# Patient Record
Sex: FEMALE | URBAN - METROPOLITAN AREA
[De-identification: names, ages, dates, MRNs, and addresses within clinical notes are randomized per-mention and may not be internally consistent; named-entity substitution may affect disease eponyms.]

---

## 2023-02-01 ENCOUNTER — LAB OUTSIDE AN ENCOUNTER (OUTPATIENT)
Dept: URBAN - METROPOLITAN AREA CLINIC 128 | Facility: CLINIC | Age: 88
End: 2023-02-01

## 2023-02-01 ENCOUNTER — DASHBOARD ENCOUNTERS (OUTPATIENT)
Age: 88
End: 2023-02-01

## 2023-02-01 ENCOUNTER — OFFICE VISIT (OUTPATIENT)
Dept: URBAN - METROPOLITAN AREA CLINIC 128 | Facility: CLINIC | Age: 88
End: 2023-02-01
Payer: MEDICARE

## 2023-02-01 VITALS
SYSTOLIC BLOOD PRESSURE: 126 MMHG | HEIGHT: 64 IN | BODY MASS INDEX: 17.24 KG/M2 | TEMPERATURE: 97.9 F | DIASTOLIC BLOOD PRESSURE: 60 MMHG | WEIGHT: 101 LBS | HEART RATE: 64 BPM

## 2023-02-01 DIAGNOSIS — R63.4 ABNORMAL LOSS OF WEIGHT: ICD-10-CM

## 2023-02-01 DIAGNOSIS — R74.8 ELEVATED ALKALINE PHOSPHATASE LEVEL: ICD-10-CM

## 2023-02-01 DIAGNOSIS — D64.9 ANEMIA: ICD-10-CM

## 2023-02-01 DIAGNOSIS — R63.0 LOSS OF APPETITE: ICD-10-CM

## 2023-02-01 PROBLEM — 79890006: Status: ACTIVE | Noted: 2023-02-01

## 2023-02-01 PROCEDURE — 99204 OFFICE O/P NEW MOD 45 MIN: CPT | Performed by: PHYSICIAN ASSISTANT

## 2023-02-01 RX ORDER — DILTIAZEM HYDROCHLORIDE 180 MG/1
1 CAPSULE CAPSULE, EXTENDED RELEASE ORAL ONCE A DAY
Status: ACTIVE | COMMUNITY

## 2023-02-01 RX ORDER — FLUTICASONE FUROATE AND VILANTEROL 100; 25 UG/1; UG/1
1 PUFF POWDER RESPIRATORY (INHALATION) ONCE A DAY
Status: ACTIVE | COMMUNITY

## 2023-02-01 RX ORDER — METOPROLOL TARTRATE 25 MG/1
1 TABLET WITH FOOD TABLET, FILM COATED ORAL TWICE A DAY
Status: ACTIVE | COMMUNITY

## 2023-02-01 RX ORDER — FERROUS SULFATE 325(65) MG
1 TABLET TABLET ORAL THREE TIMES A DAY
Qty: 90 TABLET | Refills: 1 | OUTPATIENT
Start: 2023-02-01

## 2023-02-01 RX ORDER — HYDRALAZINE HYDROCHLORIDE 25 MG/1
1 TABLET WITH FOOD TABLET, FILM COATED ORAL THREE TIMES A DAY
Status: ACTIVE | COMMUNITY

## 2023-02-01 NOTE — HPI-OTHER HISTORIES
The patient is a new patient who is here for evaluation for anemia. Her latest hgb/hct was 8.6/31.4. Her last colonoscopyw as 20 year ago. Last EGD: never. She has had a recent stroke in 12/2022 that occured while she was in the hospital. She has atrial fibrillation and is on eliquis currently. Her cardiologist is Dr. Mesa and she is under the care of a neurologist as well for her history of memory loss and stroke. She was seen at [facility] and admitted from 12/11-16/2023 for a UTI and CVA. 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 She was given tPA for her stroke but they have not followed up with the neurologist yet outpatiently.  It was noted she had a large staghorm calculus with a 4.2 cm stone in the UPJ unction on the right with right isded hydronephrosis.  She has since seen the urologist. There has been a loss of weight from 128 lbs to 101 lbs over 5 years.  Most of her history came from her daughter Abiodun. She denies melena or rectal bleeding.She live sin [facility] and enjoys the food selection there. Her vitamin B12 was <146 and folate was 2.7. Her TSH was 2.050.

## 2023-02-03 ENCOUNTER — TELEPHONE ENCOUNTER (OUTPATIENT)
Dept: URBAN - METROPOLITAN AREA CLINIC 92 | Facility: CLINIC | Age: 88
End: 2023-02-03

## 2023-02-03 PROBLEM — 87522002: Status: ACTIVE | Noted: 2023-02-03

## 2023-02-03 RX ORDER — FERROUS SULFATE 325(65) MG
1 TABLET TABLET ORAL THREE TIMES A DAY
Qty: 90 TABLET | Refills: 1 | Status: ACTIVE | COMMUNITY
Start: 2023-02-01

## 2023-02-03 RX ORDER — FLUTICASONE FUROATE AND VILANTEROL 100; 25 UG/1; UG/1
1 PUFF POWDER RESPIRATORY (INHALATION) ONCE A DAY
Status: ACTIVE | COMMUNITY

## 2023-02-03 RX ORDER — POLYETHYLENE GLYCOL 3350, SODIUM CHLORIDE, SODIUM BICARBONATE AND POTASSIUM CHLORIDE 420; 5.72; 11.2; 1.48 G/4L; G/4L; G/4L; G/4L
AS DIRECTED POWDER, FOR SOLUTION ORAL
Qty: 1 BOTTLE | Refills: 0 | OUTPATIENT
Start: 2023-02-03 | End: 2023-02-04

## 2023-02-03 RX ORDER — HYDRALAZINE HYDROCHLORIDE 25 MG/1
1 TABLET WITH FOOD TABLET, FILM COATED ORAL THREE TIMES A DAY
Status: ACTIVE | COMMUNITY

## 2023-02-03 RX ORDER — METOPROLOL TARTRATE 25 MG/1
1 TABLET WITH FOOD TABLET, FILM COATED ORAL TWICE A DAY
Status: ACTIVE | COMMUNITY

## 2023-02-03 RX ORDER — DILTIAZEM HYDROCHLORIDE 180 MG/1
1 CAPSULE CAPSULE, EXTENDED RELEASE ORAL ONCE A DAY
Status: ACTIVE | COMMUNITY

## 2023-02-05 LAB
A/G RATIO: 1.5
ABSOLUTE BASOPHILS: 64
ABSOLUTE EOSINOPHILS: 200
ABSOLUTE LYMPHOCYTES: 1229
ABSOLUTE MONOCYTES: 828
ABSOLUTE NEUTROPHILS: 6780
ALBUMIN: 4.3
ALKALINE PHOSPHATASE: 171
ALKALINE PHOSPHATASE: 185
ALT (SGPT): 14
AST (SGOT): 20
BASOPHILS: 0.7
BILIRUBIN, TOTAL: 0.4
BONE ISOENZYMES: 19
BUN/CREATININE RATIO: (no result)
BUN: 14
CA 125: 54
CA 19-9: 11
CALCIUM: 9.4
CARBON DIOXIDE, TOTAL: 22
CBC MORPHOLOGY: (no result)
CEA: 2.7
CHLORIDE: 105
CREATININE: 0.93
EGFR: 59
EOSINOPHILS: 2.2
FERRITIN, SERUM: 28
GLOBULIN, TOTAL: 2.8
GLUCOSE: 93
HEMATOCRIT: 37.2
HEMOGLOBIN: 11.5
INTESTINAL ISOENZYMES: 0
IRON BIND.CAP.(TIBC): 297
IRON SATURATION: 42
IRON: 126
LIVER ISOENZYMES: 81
LYMPHOCYTES: 13.5
MACROHEPATIC ISOENZYMES: 0
MCH: 25.8
MCHC: 30.9
MCV: 83.4
MONOCYTES: 9.1
MPV: 11.5
NEUTROPHILS: 74.5
PLACENTAL ISOENZYMES: 0
PLATELET COUNT: 267
POTASSIUM: 4.7
PROTEIN, TOTAL: 7.1
RDW: 24.7
RED BLOOD CELL COUNT: 4.46
SODIUM: 140
T4, FREE: 0.9
TSH: 1.85
WHITE BLOOD CELL COUNT: 9.1

## 2023-02-08 ENCOUNTER — TELEPHONE ENCOUNTER (OUTPATIENT)
Dept: URBAN - METROPOLITAN AREA CLINIC 19 | Facility: CLINIC | Age: 88
End: 2023-02-08

## 2023-02-28 ENCOUNTER — TELEPHONE ENCOUNTER (OUTPATIENT)
Dept: URBAN - METROPOLITAN AREA CLINIC 128 | Facility: CLINIC | Age: 88
End: 2023-02-28

## 2023-02-28 PROBLEM — 87522002: Status: ACTIVE | Noted: 2023-02-28

## 2023-04-12 ENCOUNTER — OFFICE VISIT (OUTPATIENT)
Dept: URBAN - METROPOLITAN AREA MEDICAL CENTER 18 | Facility: MEDICAL CENTER | Age: 88
End: 2023-04-12